# Patient Record
Sex: MALE | Race: BLACK OR AFRICAN AMERICAN
[De-identification: names, ages, dates, MRNs, and addresses within clinical notes are randomized per-mention and may not be internally consistent; named-entity substitution may affect disease eponyms.]

---

## 2019-03-10 ENCOUNTER — HOSPITAL ENCOUNTER (EMERGENCY)
Dept: HOSPITAL 76 - ED | Age: 34
Discharge: HOME | End: 2019-03-10
Payer: COMMERCIAL

## 2019-03-10 VITALS — SYSTOLIC BLOOD PRESSURE: 117 MMHG | DIASTOLIC BLOOD PRESSURE: 82 MMHG

## 2019-03-10 DIAGNOSIS — M72.2: Primary | ICD-10-CM
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
## 2019-03-10 PROCEDURE — 99283 EMERGENCY DEPT VISIT LOW MDM: CPT

## 2019-03-10 PROCEDURE — 73630 X-RAY EXAM OF FOOT: CPT

## 2019-03-10 NOTE — ED PHYSICIAN DOCUMENTATION
PD HPI LOWER EXT INJURY





- Stated complaint


Stated Complaint: L FOOT PAIN





- Chief complaint


Chief Complaint: Trauma Ext





- History obtained from


History obtained from: Patient





- History of Present Illness


PD HPI LOW EXT INJURY LOCATION: Left, Foot


Type of injury: Twist (He was playing basketball implanted onto the left foot 

and felt a pop or pull on the bottom of the foot.  He is continued to heat hurt 

with walking since that time.  It seemed to be improving some but then read 

turned to the same level over the last few days.  He had not been doing any 

vigorous sports.  He had not taken any medicine.  He presumed it was sprain and 

would just improve but subsequently has not)


Timing - onset: How many weeks ago (2)


Timing - duration: Weeks (2)


Timing - details: Abrupt onset, Still present


Associated symptoms: Swelling (plantar area and with pain on walking and 

stretching of the bottom of the foot.).  No: Weakness, Numbness


Similar symptoms before: Has not had sx before


Recently seen: Not recently seen, Other (has not taken any meds for it)





Review of Systems


Constitutional: denies: Fever


Nose: denies: Rhinorrhea / runny nose, Congestion


Throat: denies: Sore throat


Respiratory: denies: Cough


Skin: denies: Rash, Lesions





PD PAST MEDICAL HISTORY





- Past Medical History


Musculoskeletal: None





- Present Medications


Home Medications: 


                                Ambulatory Orders











 Medication  Instructions  Recorded  Confirmed


 


Naproxen 500 mg PO BID #20 tablet 03/10/19 














- Allergies


Allergies/Adverse Reactions: 


                                    Allergies











Allergy/AdvReac Type Severity Reaction Status Date / Time


 


No Known Drug Allergies Allergy   Verified 03/10/19 10:52














PD ED PE NORMAL





- Vitals


Vital signs reviewed: Yes





- General


General: Alert and oriented X 3, No acute distress, Well developed/nourished





- Derm


Derm: Normal color, Warm and dry





- Extremities


Extremities: Other (plantar aspect  proximal plantar without rash, 

sores, redness. )





- Neuro


Neuro: Alert and oriented X 3, No motor deficit, No sensory deficit, Normal 

speech





Results





- Vitals


Vitals: 


                                     Oxygen











O2 Source                      Room air

















- Rads (name of study)


  ** foot xray


Radiology: Prelim report reviewed (normal), See rad report





PD MEDICAL DECISION MAKING





- ED course


Complexity details: considered differential (tender proximal plantar soft tissue

without skin sores, redness, warmth. Achilles not tender. ), d/w patient





Departure





- Departure


Disposition: 01 Home, Self Care


Clinical Impression: 


 Plantar fasciitis of left foot





Condition: Stable


Record reviewed to determine appropriate education?: Yes


Instructions:  ED Plantar Fasciitis


Follow-Up: 


[facility] [place] [Provider Group]


Prescriptions: 


Naproxen 500 mg PO BID #20 tablet


Comments: 


Use a firm soled shoe to reduce midfoot motion when walking.   a heel cup

at the store as this allows for changing the angle at the Achilles and plantar 

fascia just ever so slightly to reduce the tension on it.  This combined with 

some anti-inflammatories naproxen twice daily for 7-10 days should allow this to

get better over the next several days to week.  Follow-up with your primary if 

not improved over the next 4-5 days.  There are other treatment options if this 

does not allow it to get better but this is the most common and commonly 

effective combination.


Discharge Date/Time: 03/10/19 12:53

## 2019-07-16 ENCOUNTER — HOSPITAL ENCOUNTER (OUTPATIENT)
Dept: HOSPITAL 76 - SDS | Age: 34
Discharge: HOME | End: 2019-07-16
Attending: SURGERY
Payer: COMMERCIAL

## 2019-07-16 VITALS — SYSTOLIC BLOOD PRESSURE: 133 MMHG | DIASTOLIC BLOOD PRESSURE: 87 MMHG

## 2019-07-16 DIAGNOSIS — Z87.891: ICD-10-CM

## 2019-07-16 DIAGNOSIS — D17.0: Primary | ICD-10-CM

## 2019-07-16 PROCEDURE — 21012 EXC FACE LES SBQ 2 CM/>: CPT

## 2019-07-16 PROCEDURE — 0JB00ZZ EXCISION OF SCALP SUBCUTANEOUS TISSUE AND FASCIA, OPEN APPROACH: ICD-10-PCS | Performed by: SURGERY

## 2019-07-16 RX ADMIN — BUPIVACAINE HYDROCHLORIDE AND EPINEPHRINE BITARTRATE ONE ML: 5; .0091 INJECTION, SOLUTION EPIDURAL; INTRACAUDAL; PERINEURAL at 09:40

## 2019-07-16 RX ADMIN — LIDOCAINE HYDROCHLORIDE ONE ML: 10 INJECTION, SOLUTION EPIDURAL; INFILTRATION; INTRACAUDAL; PERINEURAL at 09:39

## 2019-07-16 RX ADMIN — LIDOCAINE HYDROCHLORIDE ONE ML: 10 INJECTION, SOLUTION EPIDURAL; INFILTRATION; INTRACAUDAL; PERINEURAL at 10:06

## 2019-07-16 RX ADMIN — BUPIVACAINE HYDROCHLORIDE AND EPINEPHRINE BITARTRATE ONE ML: 5; .0091 INJECTION, SOLUTION EPIDURAL; INTRACAUDAL; PERINEURAL at 10:06

## 2019-07-16 NOTE — OPERATIVE REPORT
Operative Report





- General


Procedure Date: 07/16/19


Planned Procedure: 





Excision of right temporal scalp mass


Pre-Op Diagnosis: Right temporal Scalp mass


Procedure Performed: 





Excision of Right temporal scalp mass


Post Op Diagnosis: Same





- Procedure Note


Primary Surgeon: Poornima


Secondary Surgeon: Edvin


Anesthesia Provider: Allie


Anesthesia Technique: Local, MAC


Pathology: 





Temporal mass to pathology in formalin


Estimated Blood Loss (mL): 10


Findings: 





3 cm circular fatty mass attached to the galea of the scalp. The right temporal 

artery was stretched over the mass


Complications: 





None apparent





- Other


Other Information/Narrative: 





After obtaining informed consent, the patient is brought to the operating room 

and placed in the supine position on the operating table.  Following sedation 

per anesthesia, the right scalp was prepped and draped in the standard surgical 

fashion.  A timeout was held per SCOAP protocol.


Following infiltration with local anesthetic to create a field block, a 1.5 cm 

incision was created along the edge of the hairline.  This was directly over the

mass.  This was carried down through the skin and subcutaneous tissue to reveal 

a smooth fatty mass below.  At this point we noted the patient had become 

obstructed.Oral airway was placed per anesthesia and we were able to again 

ventilate him.  This was consistent with undiagnosed sleep apnea. The temporal 

artery was seen to lie directly over the mass anteriorly. A single branch of the

temporal artery was divided and suture ligated leaving the remainder of the 

temporal artery pushed anteriorly off of the mass and in anatomical position. 

The mass was then carefully dissected off of the underlying galea and delivered 

into the field.The wound was checked for hemostasis and irrigated with warm 

saline solution.  It was closed in 2 layers with Vicryl and Monocryl suture.  

All sponge, needle, and instrument counts were correct at the conclusion of the 

case.  The patient was allowed to awake from anesthesia without difficulty and 

taken to the postanesthesia care unit in good condition.

## 2019-07-16 NOTE — ANESTHESIA
Pre-Anesthesia VS, & Labs





- Diagnosis





R temporal mass





- Procedure





Excision lesion, L temporal mass





                                        





Height                           5 ft 10 in


Body Mass Index                  25.8











Home Medications and Allergies


Allergies/Adverse Reactions: 


                                    Allergies











Allergy/AdvReac Type Severity Reaction Status Date / Time


 


No Known Drug Allergies Allergy   Verified 03/10/19 10:52














Anes History & Medical History





- Anesthetic History


Anesthesia Complications: reports: No previous complications


Family history of Anesthesia Complications: Denies


Family history of Malignant Hyperthermia: Denies





- Medical History


Cardiovascular: reports: None


Pulmonary: reports: None


Gastrointestinal: reports: None


Urinary: reports: None


Musculoskeletal: reports: None


Endocrine/Autoimmune: reports: None


Skin: reports: None





- Surgical History


Eyes Ears Nose Throat (EENT): Other (jaw widening procedure. Step 1 of 2 has 

been completed. did not return for step 2.)





Exam


General: Alert, Oriented x3, Cooperative


Dental: WNL, TMJ (locks if opens too far d/t mandible surgery (widening 

procedure))


Mouth Opening: Greater than 4 Fingerbreadths


Neck Mobility: Normal


Mallampati classification: I


Thyromental Distance: 4-6 cm


Respiratory: Lungs clear, Normal breath sounds, No respiratory distress, No 

accessory muscle use


Cardiovascular: Regular rate


Neurological: Normal speech


Mental/Cognitive Status: Alert/Oriented X3, Normal for patient


Cognitive Status: Within normal limits





Plan


Anesthesia Type: General (backup), MAC


Regional Block: Per Surgeon's request for Post Op pain control


Consent for Procedure(s) Verified and Reviewed: Yes


Code Status: Attempt Resuscitation


ASA classification: 1-Healthy patient


Is this case an emergency?: No

## 2020-03-13 NOTE — XRAY REPORT
Reason:  pain swelling. injury 2 weeks ago

Procedure Date:  03/10/2019   

Accession Number:  [id number] / [id number]                    

Procedure:  XR  - Foot 3 View LT CPT Code:  

 

FULL RESULT:

 

 

EXAM:

LEFT FOOT RADIOGRAPHY

 

EXAM DATE: 3/10/2019 11:19 AM.

 

CLINICAL HISTORY: Foot pain and swelling post injury.

 

COMPARISON: None.

 

TECHNIQUE: 3 views.

 

FINDINGS:

Bones: Patient is status post resection of the tip of the distal phalanx 

of the second digit. No acute fracture noted.

 

Joints: Normal. No subluxations.

 

Soft Tissues: There is mild plantar soft tissue swelling, prominence at 

the forefoot.

IMPRESSION:

1. Status post resection of tip of distal phalanx of the second digit 

without acute fracture.

2. Mild forefoot plantar soft tissue swelling.

 

RADIA 1. Continue Tylenol 650mg PO TID  2. Continue Oxycodone IR 5/10mg PO q6hrs PRN moderate/severe pain, respectively

## 2022-12-26 ENCOUNTER — HOSPITAL ENCOUNTER (EMERGENCY)
Age: 37
Discharge: HOME | End: 2022-12-26
Attending: EMERGENCY MEDICINE

## 2022-12-26 VITALS
HEIGHT: 72 IN | WEIGHT: 185 LBS | RESPIRATION RATE: 18 BRPM | HEART RATE: 83 BPM | OXYGEN SATURATION: 100 % | SYSTOLIC BLOOD PRESSURE: 150 MMHG | BODY MASS INDEX: 25.06 KG/M2 | DIASTOLIC BLOOD PRESSURE: 89 MMHG

## 2022-12-26 DIAGNOSIS — H61.21 IMPACTED CERUMEN OF RIGHT EAR: Primary | ICD-10-CM

## 2022-12-26 PROCEDURE — 99282 EMERGENCY DEPT VISIT SF MDM: CPT

## 2022-12-26 PROCEDURE — 69209 REMOVE IMPACTED EAR WAX UNI: CPT

## 2022-12-26 ASSESSMENT — PAIN SCALES - GENERAL: PAINLEVEL_OUTOF10: 0
